# Patient Record
Sex: MALE | Race: WHITE | NOT HISPANIC OR LATINO | URBAN - METROPOLITAN AREA
[De-identification: names, ages, dates, MRNs, and addresses within clinical notes are randomized per-mention and may not be internally consistent; named-entity substitution may affect disease eponyms.]

---

## 2024-06-06 ENCOUNTER — APPOINTMENT (RX ONLY)
Dept: URBAN - METROPOLITAN AREA CLINIC 150 | Facility: CLINIC | Age: 79
Setting detail: DERMATOLOGY
End: 2024-06-06

## 2024-06-06 PROBLEM — C44.1122 BASAL CELL CARCINOMA OF SKIN OF RIGHT LOWER EYELID, INCLUDING CANTHUS: Status: ACTIVE | Noted: 2024-06-06

## 2024-06-06 PROCEDURE — ? CONSULTATION FOR MOHS SURGERY

## 2024-06-06 PROCEDURE — 99213 OFFICE O/P EST LOW 20 MIN: CPT

## 2024-06-06 PROCEDURE — ? ADDITIONAL NOTES

## 2024-06-06 NOTE — PROCEDURE: CONSULTATION FOR MOHS SURGERY
Detail Level: Detailed
X Size Of Lesion In Cm (Optional): 0
Other Plan: Dr. Tamayo, oculoplastics surgeon, was present for discussion of treatment plan with family and agreed that a CT scan and further evaluation at the [facility] would be the best treatment for the patient. Patient and patient family agreed with plan.
Incorporate Mauc In Note: Yes

## 2024-06-06 NOTE — PROCEDURE: ADDITIONAL NOTES
Detail Level: Simple
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 Note his oncologist spoke to me earlier today because of concerns regarding history of CLL as well as depth of invasion. Upon seeing the basal cell carcinoma, it was noted that it is very sclerotic and likely been worked on before. Given this there will be a need for significant removal, including possibly bone and likely would be best to do in a more controlled setting like a hospital. Discussed this with his family member. Dr. Parikh was present in the room to evaluate as well and his team will help set up for preoperative CT scan for further evaluation and then decide on the best course of action. Of note he is already being treated with vismodegib for treatment of other basal cells and this is being prescribed by oncologist. All questions answered to the ability with patient and family understood the plan. Dr Tamayo's team will proceed from here for further treatment.
Render Risk Assessment In Note?: no